# Patient Record
Sex: MALE | Race: WHITE | URBAN - METROPOLITAN AREA
[De-identification: names, ages, dates, MRNs, and addresses within clinical notes are randomized per-mention and may not be internally consistent; named-entity substitution may affect disease eponyms.]

---

## 2018-01-22 ENCOUNTER — OFFICE VISIT (OUTPATIENT)
Dept: ORTHOPEDIC SURGERY | Age: 28
End: 2018-01-22

## 2018-01-22 VITALS — BODY MASS INDEX: 25.77 KG/M2 | RESPIRATION RATE: 12 BRPM | HEIGHT: 70 IN | WEIGHT: 180 LBS

## 2018-01-22 DIAGNOSIS — S93.491A SPRAIN OF ANTERIOR TALOFIBULAR LIGAMENT OF RIGHT ANKLE, INITIAL ENCOUNTER: ICD-10-CM

## 2018-01-22 DIAGNOSIS — M25.571 ACUTE RIGHT ANKLE PAIN: Primary | ICD-10-CM

## 2018-01-22 PROCEDURE — 99203 OFFICE O/P NEW LOW 30 MIN: CPT | Performed by: ORTHOPAEDIC SURGERY

## 2018-01-22 PROCEDURE — L1902 AFO ANKLE GAUNTLET PRE OTS: HCPCS | Performed by: ORTHOPAEDIC SURGERY

## 2018-01-22 RX ORDER — NAPROXEN 500 MG/1
500 TABLET ORAL 2 TIMES DAILY WITH MEALS
Qty: 60 TABLET | Refills: 2 | Status: SHIPPED | OUTPATIENT
Start: 2018-01-22

## 2018-01-22 ASSESSMENT — ENCOUNTER SYMPTOMS
EYES NEGATIVE: 1
ALLERGIC/IMMUNOLOGIC COMMENTS: SEASONAL
RESPIRATORY NEGATIVE: 1
GASTROINTESTINAL NEGATIVE: 1

## 2018-01-22 NOTE — PROGRESS NOTES
Subjective:      Patient ID: Marcin Valverde is a 28 y.o. male. HPI  Marcin Valverde presents today for an acute injury to his right ankle. He was injured playing soccer on Wednesday, January 17. His pain has improved but he still has swelling. He's been using a boot and crutches intermittently. Pain is about 4 out of 10. He has a history of a bad ankle sprain when he was in high school in 2006. He also has had 2 fractures in that ankle before the age of 7. Recently he has not had much trouble. He typically wears an ankle brace. He has not been taking medicine. He is otherwise healthy. 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 Review of Systems   Constitutional: Negative. HENT: Negative. Eyes: Negative. Respiratory: Negative. Cardiovascular: Positive for leg swelling. Ankle   Gastrointestinal: Negative. Endocrine: Negative. Genitourinary: Negative. Musculoskeletal: Negative. Skin: Negative. Allergic/Immunologic: Positive for environmental allergies. Seasonal   Neurological: Negative. Hematological: Negative. Psychiatric/Behavioral: Negative. Objective:   Physical Exam  General Exam:    Vitals: Resp. rate 12, height 5' 10\" (1.778 m), weight 180 lb (81.6 kg). Constitutional: Patient is adequately groomed with no evidence of malnutrition  Mental Status: The patient is oriented to time, place and person. The patient's mood and affect are appropriate. Gait:  Patient walks with an antalgic gait. Lymphatic: The lymphatic examination bilaterally reveals all areas to be without enlargement or induration. Vascular: Examination reveals no swelling or calf tenderness. Peripheral pulses are palpable and 2+. Neurological: The patient has good coordination. There is no weakness or sensory deficit. Skin:    Head/Neck: inspection reveals no rashes, ulcerations or lesions.   Trunk:  inspection reveals no rashes, ulcerations or lesions. Right Lower Extremity: inspection reveals no rashes, ulcerations or lesions. Left Lower Extremity: inspection reveals no rashes, ulcerations or lesions. Left ankle exam is normal.  There is no swelling or tenderness. He has full motion and a normal neurovascular exam.    Right ankle exam demonstrates moderate swelling. He has moderate tenderness medially over the medial malleolus and deltoid ligament. There is no pain in the syndesmosis. He has moderate to significant pain laterally over the distal fibula and ATFL. There is no gross instability. There is no snapping pain with flexion or extension but he has moderate tightness. He has brisk capillary refill and normal sensation. Calf is soft without DVT. Right ankle x-rays obtained today AP, lateral, and mortise views demonstrate:  Calcifications medially and laterally consistent with old chronic ankle sprains but no acute bony abnormalities. Assessment:      Right ankle sprain      Plan: We will use anti-inflammatory. Prescription for Naprosyn was provided. He will joint Aircast today. He'll see the physical therapist for at least one visit. Follow-up with me on an as-needed basis. All his questions have been answered. He agrees with this plan. Procedures    Aircast Air Sport Ankle Brace     Patient was prescribed an Aircast Air Sport Brace. The right ankle will require stabilization / immobilization from this semi-rigid / rigid orthosis to improve their function. The orthosis will assist in protecting the affected area, provide functional support and facilitate healing. The patient was educated and fit by a healthcare professional with expert knowledge and specialization in brace application while under the direct supervision of the treating physician. Verbal and written instructions for the use of and application of this item were provided.    They were instructed to contact the office immediately should the

## 2018-01-24 ENCOUNTER — HOSPITAL ENCOUNTER (OUTPATIENT)
Dept: OTHER | Age: 28
Discharge: HOME | End: 2018-01-31
Attending: ORTHOPAEDIC SURGERY | Admitting: ORTHOPAEDIC SURGERY

## 2018-01-24 NOTE — FLOWSHEET NOTE
proximal hip and LE with self care and ADLs  [] (46504) Gait Re-education- Provided training and instruction to the patient for proper LE, core and proximal hip recruitment and positioning and eccentric body weight control with ambulation re-education including up and down stairs     Home Exercise Program:    [x] (28211) Reviewed/Progressed HEP activities related to strengthening, flexibility, endurance, ROM of core, proximal hip and LE for functional self-care, mobility, lifting and ambulation/stair navigation   [] (88393)Reviewed/Progressed HEP activities related to improving balance, coordination, kinesthetic sense, posture, motor skill, proprioception of core, proximal hip and LE for self care, mobility, lifting, and ambulation/stair navigation      Manual Treatments:  PROM / STM / Oscillations-Mobs:  G-I, II, III, IV (PA's, Inf., Post.)  [] (19150) Provided manual therapy to mobilize LE, proximal hip and/or LS spine soft tissue/joints for the purpose of modulating pain, promoting relaxation,  increasing ROM, reducing/eliminating soft tissue swelling/inflammation/restriction, improving soft tissue extensibility and allowing for proper ROM for normal function with self care, mobility, lifting and ambulation. Other:  Patient education on PT and plan of care including diagnosis, prognosis, treatment goals and options. Patient agrees with discussed POC and treatment and is aware of rehab process. Pt was also educated on clinic layout and use of modalities. PT gave pt business card to call if any questions.         Modalities: vaso-15' elevated    Charges:   Timed Code Treatment Minutes: 23'   Total Treatment Minutes: 61'     [x] EVAL (LOW) 77158   [] EVAL (MOD) 73017   [] EVAL (HIGH) 91366   [] RE-EVAL   [x] GO(29433) x  1   [] IONTO  [] NMR (90499) x      [x] VASO  [] Manual (11477) x       [] Other:  [x] TA x  1    [] Mech Traction (97443)  [] ES(attended) (73901)      [] ES (un) (48957):     GOALS:  Patient stated goal: get back to running regularly and improve ability to shift/do lateral movement    Therapist goals for Patient:   Short Term Goals: To be achieved in: 2 weeks  1. Independent in HEP and progression per patient tolerance, in order to prevent re-injury. 2. Patient will report pain at worst less than or equal to 5/10 to facilitate improvement in movement, function, and ADLs as indicated by functional deficits. Long Term Goals: To be achieved in: 4-6 weeks  1. Pt will demo LEFS of  90% or or greater in order to assist with reaching prior level of function. 2. Patient will demonstrate increased AROM in dorsiflexion greater than or equal to 5, plantarflexion greater than or equal to 55, inversion greater than or equal to 30, eversion greater than or equal to 9 to allow for proper joint functioning as indicated by patients functional deficits. 3. Patient will demonstrate an increase in strength greater than or equal to 4+ to allow for proper functional mobility as indicated by patients functional deficits. 4. Patient will return to running without increased pain or restrictions. 5. Pt will perform lateral movements without c/o pain. 6. Pt will report pain at worst less than or equal to 2/10. Progression Towards Functional goals:  [] Patient is progressing as expected towards functional goals listed. [] Progression is slowed due to complexities listed. [] Progression has been slowed due to co-morbidities.   [x] Plan just implemented, too soon to assess goals progression  [] Other:     ASSESSMENT:  See eval    Treatment/Activity Tolerance:  [] Patient tolerated treatment well [] Patient limited by fatigue  [] Patient limited by pain  [] Patient limited by other medical complications  [] Other: Pt is a 27 y/o male presenting with diagnosis of right ankle sprain from the MD.  Clinically, the pt presents with decreased ROM, decreased strength, decreased function, and increased pain consistent

## 2018-01-24 NOTE — PLAN OF CARE
[facility], [address] [place] [address], [address] [facility]  Phone: [phone]   Fax: [phone]                                                              Physical Therapy Certification    Dear Referring Practitioner: Chaitanya Mazariegos,    We had the pleasure of evaluating the following patient for physical therapy services at [address] [place] [address]. A summary of our findings can be found in the initial assessment below. This includes our plan of care. If you have any questions or concerns regarding these findings, please do not hesitate to contact me at the office phone number checked above. Thank you for the referral.       Physician Signature:_______________________________Date:__________________  By signing above (or electronic signature), therapists plan is approved by physician      Patient: Aviva Fonseca   : 1990   MRN: [id number]  Referring Physician: Referring Practitioner: Josef      Evaluation Date: 2018      Medical Diagnosis Information:  Diagnosis: M25.571 (ICD-10-CM) - Acute right ankle pain; S93.491 (ICD-10-CM) - Sprain of anterior talofibular ligament of right ankle   Treatment Diagnosis: M25.571 (ICD-10-CM) - Acute right ankle pain                                           Precautions/ Contra-indications: NA  Latex Allergy:  [x]NO      []YES  Preferred Language for Healthcare:   [x]English       []other:    SUBJECTIVE: Patient stated complaint: Pt was playing soccer. He stepped back and rolled his ankle into inversion. He was wearing a brace. This happened last week. He has broken his ankle twice (around 6 and 8 y/o). Relevant Medical History:  H/o ankle fx. Multiple sprains. Functional Disability Index:  LEFS    Pain Scale: 0/10  Easing factors: 0/10  In AirCast, ice, borrowed walking boot and crutches to stay off it until seeing MD, NSAID after injury. He does have a lace up brace.   Provocative decreased function, and increased pain consistent with the MD diagnosis. The pt would benefit from skilled PT to return to PLOF. Pt does have a h/o ankle sprains and fractures. He also is on his feet t/o the day at work. He demo significant swelling. Prognosis/Rehab Potential:      []Excellent   [x]Good    []Fair   []Poor    Tolerance of evaluation/treatment:    []Excellent   [x]Good    []Fair   []Poor    PLAN  Frequency/Duration:  1-2 days per week for  4-6 Weeks:  Interventions:  [x]  Therapeutic exercise including: strength training, ROM, for Lower extremity and core   [x]  NMR activation and proprioception for LE, Glutes and Core   [x]  Manual therapy as indicated for LE, Hip and spine to include: Dry Needling/IASTM, STM, PROM, Gr I-IV mobilizations, manipulation. [x] Modalities as needed that may include: thermal agents, E-stim, Biofeedback, US, iontophoresis as indicated  [x] Patient education on joint protection, postural re-education, activity modification, progression of HEP. HEP instruction: (see scanned forms)    GOALS:  Patient stated goal: get back to running regularly and improve ability to shift/do lateral movement    Therapist goals for Patient:   Short Term Goals: To be achieved in: 2 weeks  1. Independent in HEP and progression per patient tolerance, in order to prevent re-injury. 2. Patient will report pain at worst less than or equal to 5/10 to facilitate improvement in movement, function, and ADLs as indicated by functional deficits. Long Term Goals: To be achieved in: 4-6 weeks  1. Pt will demo LEFS of  90% or or greater in order to assist with reaching prior level of function. 2. Patient will demonstrate increased AROM in dorsiflexion greater than or equal to 5, plantarflexion greater than or equal to 55, inversion greater than or equal to 30, eversion greater than or equal to 9 to allow for proper joint functioning as indicated by patients functional deficits.    3. Patient will demonstrate an increase in strength greater than or equal to 4+ to allow for proper functional mobility as indicated by patients functional deficits. 4. Patient will return to running without increased pain or restrictions. 5. Pt will perform lateral movements without c/o pain. 6. Pt will report pain at worst less than or equal to 2/10. Physical Therapy Evaluation Complexity Justification  [x] A history of present problem with:  [] no personal factors and/or comorbidities that impact the plan of care;  [x]1-2 personal factors and/or comorbidities that impact the plan of care  []3 personal factors and/or comorbidities that impact the plan of care  [x] An examination of body systems using standardized tests and measures addressing any of the following: body structures and functions (impairments), activity limitations, and/or participation restrictions;:  [] a total of 1-2 or more elements   [] a total of 3 or more elements   [x] a total of 4 or more elements   [x] A clinical presentation with:  [x] stable and/or uncomplicated characteristics   [] evolving clinical presentation with changing characteristics  [] unstable and unpredictable characteristics;   [x] Clinical decision making of [x] low, [] moderate, [] high complexity using standardized patient assessment instrument and/or measurable assessment of functional outcome.     [x] EVAL (LOW) 94351 (typically 20 minutes face-to-face)  [] EVAL (MOD) 37285 (typically 30 minutes face-to-face)  [] EVAL (HIGH) 70720 (typically 45 minutes face-to-face)  [] Sabas Linder    Electronically signed by:  Stephane Garcia, PT, DPT [id number]

## 2018-01-31 ENCOUNTER — HOSPITAL ENCOUNTER (OUTPATIENT)
Dept: PHYSICAL THERAPY | Age: 28
Discharge: HOME | End: 2018-02-01
Admitting: ORTHOPAEDIC SURGERY

## 2018-01-31 NOTE — FLOWSHEET NOTE
Pt paul tx well. He did bring his lace up brace. It does not have a figure 8 configuration, but he will try it. I have instructed him to come out of the AirCast as long as he feels comfortable. He will use the AirCast as he needs. Otherwise the lace up brace is appropriate at this time. He is agreeable. Pt was given updated HEP and black band for home. Prognosis: [] Good [] Fair  [] Poor    Patient Requires Follow-up: [x] Yes  [] No    PLAN: Progress functional activities/sport related activities.     [x] Continue per plan of care [] Alter current plan (see comments)  [] Plan of care initiated [] Hold pending MD visit [] Discharge    Electronically signed by: Anastacia Squires, LORNA [id number]

## 2018-02-01 ENCOUNTER — HOSPITAL ENCOUNTER (OUTPATIENT)
Dept: OTHER | Age: 28
Discharge: HOME | End: 2018-02-28
Attending: ORTHOPAEDIC SURGERY | Admitting: ORTHOPAEDIC SURGERY

## 2018-03-01 ENCOUNTER — HOSPITAL ENCOUNTER (OUTPATIENT)
Dept: OTHER | Age: 28
Discharge: HOME | End: 2018-03-31
Attending: ORTHOPAEDIC SURGERY | Admitting: ORTHOPAEDIC SURGERY